# Patient Record
(demographics unavailable — no encounter records)

---

## 2024-11-13 NOTE — PROCEDURE
[Flexible Endoscope] : examined with the flexible endoscope [Deviated to the Rt] : deviated to the right [Normal] : the paranasal sinuses had no abnormalities [FreeTextEntry6] : done to eval for omu patency, npx mass severe R DNS, r nasal valve collapse, r septal spur, b turbinate hypertrophy

## 2024-11-13 NOTE — HISTORY OF PRESENT ILLNESS
[de-identified] : 50 y/o M p/w snoring for several years. He reports that he is a mouth breather, dry mouth in the AM. He denies nocturia or waking up from snoring. He reports h/o sleep study x10 years ago. He states that overeating and alcohol worsens his snoring. Patient reports h/o nasal trauma. Patient lost 15 lbs in the past year, does not notice any change in snoring after weight loss. Patient denies weight gain. snoring  EDS nasal obstruction Get PSG

## 2024-11-13 NOTE — PHYSICAL EXAM
[] : septum deviated to the right [Midline] : trachea located in midline position [Normal] : assessment of respiratory effort is normal [de-identified] : thickened uvula

## 2025-07-30 NOTE — PROCEDURE
[FreeTextEntry1] : Uvuloplasty, Turbinate Reduction, Open SMR  [FreeTextEntry2] : MARI, Turbinate Hypertrophy, Chronic Nasal Congestion  [FreeTextEntry3] : Procedure note: After informed verbal consent was obtained, the oral cavity was topically anesthetized with 4% lidocaine spray. The patient was given safety goggles to wear. 2 cc of 2% lidocaine with epinephrine was injected superior to uvula for further local anesthesia. The tongue was gently pressed down with a tongue blade and uvula was removed using a diode laser in contact mode. The uvula was placed in a sterile cup filled with formalin, labeled with patients demographics to be sent to pathology. The palatopharyngeal arch proximate to uvula was stiffened using diode laser in contact mode. Patient was then given cold water to gargle and spit out secretions.  The patient was stable and well during and after the procedure. Post op care instructions discussed; worksheet provided.     After informed verbal consent was obtained, pledgets soaked in a 1:1 ratio of topical vasoconstrictor (0.05% oxymetazoline) and anesthetic (4% lidocaine) was applied to b nasal cavity. The patient was given safety glasses to wear.  Pledgets were then removed with bayonnet forceps. Local anesthesia (2% lidocaine + epi injection (1:100,000) was applied to anterior aspect of inferior turbinates bilaterally and sites proximal to swell bodies to b nasal cavities. 0 degree rigid endoscope with video was then inserted into the left nasal passage to visualize anatomy and was used during the rest of the procedure. Left laser turbinate reduction and l swell body ablation was performed using diode laser. Pledgets soaked in a 1:1 ratio of topical vasoconstrictor (0.05% oxymetazoline) and anesthetic (4% lidocaine) were again inserted to l nasal passage to achieve hemostasis. Right laser turbinate reduction and right swell body ablation was performed using diode laser. Surgical smoke generated was evacuated using suction.  Pledgets soaked in a 1:1 ratio of topical vasoconstrictor (0.05% oxymetazoline) and anesthetic (4% lidocaine) was again to right nasal passage to achieve hemostasis. Pledgets were then removed from bilateral nasal passage with bayonnet forceps. No epistaxis was noted. Bilateral nasal passage was dressed with bacitracin ointment and tip dressing. Patient was stable and well during and after the procedure. Patient understood all post-op education material provided.

## 2025-07-30 NOTE — ASSESSMENT
[FreeTextEntry1] : 1. MARI -uvulectomy and right nasal valve repair/open submucosal resection performed without complication -istop:  [id number] -

## 2025-07-30 NOTE — ASSESSMENT
[FreeTextEntry1] : 1. MARI -uvulectomy and right nasal valve repair/open submucosal resection performed without complication -istop:  [id number] -

## 2025-07-30 NOTE — HISTORY OF PRESENT ILLNESS
[de-identified] : 1 month F/U for this 50yo M with h/o moderate MARI, GERD, elongated uvula, R nasal valve collapse, severe R DNS, & b/L turb hypertrophy. Patient is here today for ENT procedures: Uvuloplasty, Open SMR, & Turbinate Reduction.

## 2025-07-30 NOTE — HISTORY OF PRESENT ILLNESS
[de-identified] : 1 month F/U for this 50yo M with h/o moderate MARI, GERD, elongated uvula, R nasal valve collapse, severe R DNS, & b/L turb hypertrophy. Patient is here today for ENT procedures: Uvuloplasty, Open SMR, & Turbinate Reduction.